# Patient Record
Sex: MALE | Employment: OTHER | URBAN - METROPOLITAN AREA
[De-identification: names, ages, dates, MRNs, and addresses within clinical notes are randomized per-mention and may not be internally consistent; named-entity substitution may affect disease eponyms.]

---

## 2020-07-16 ENCOUNTER — HOSPITAL ENCOUNTER (OUTPATIENT)
Dept: MAMMOGRAPHY | Age: 75
Discharge: HOME | End: 2020-07-16
Attending: DERMATOLOGY
Payer: MEDICARE

## 2020-07-16 ENCOUNTER — HOSPITAL ENCOUNTER (OUTPATIENT)
Dept: ULTRASOUND IMAGING | Age: 75
Discharge: HOME | End: 2020-07-16
Attending: DERMATOLOGY
Payer: MEDICARE

## 2020-07-16 DIAGNOSIS — N63.0 MASS OF BREAST: ICD-10-CM

## 2020-07-16 DIAGNOSIS — R92.8 ABNORMAL MAMMOGRAM: ICD-10-CM

## 2021-09-28 ENCOUNTER — OFFICE VISIT (OUTPATIENT)
Dept: SURGERY | Age: 76
End: 2021-09-28
Payer: MEDICARE

## 2021-09-28 VITALS
HEART RATE: 70 BPM | HEIGHT: 71 IN | DIASTOLIC BLOOD PRESSURE: 79 MMHG | RESPIRATION RATE: 18 BRPM | BODY MASS INDEX: 27.5 KG/M2 | TEMPERATURE: 97.3 F | SYSTOLIC BLOOD PRESSURE: 103 MMHG | WEIGHT: 196.4 LBS | OXYGEN SATURATION: 98 %

## 2021-09-28 DIAGNOSIS — S80.12XA HEMATOMA OF LEFT LOWER EXTREMITY, INITIAL ENCOUNTER: Primary | ICD-10-CM

## 2021-09-28 PROBLEM — S80.10XA HEMATOMA OF LEG: Status: ACTIVE | Noted: 2021-09-28

## 2021-09-28 PROCEDURE — 99203 OFFICE O/P NEW LOW 30 MIN: CPT | Performed by: SURGERY

## 2021-09-28 RX ORDER — POTASSIUM CHLORIDE 1500 MG/1
20 TABLET, EXTENDED RELEASE ORAL 2 TIMES DAILY
COMMUNITY
Start: 2021-07-31

## 2021-09-28 RX ORDER — ALENDRONATE SODIUM 70 MG/1
TABLET ORAL
COMMUNITY
Start: 2021-07-22

## 2021-09-28 RX ORDER — EPLERENONE 25 MG/1
TABLET, FILM COATED ORAL DAILY
COMMUNITY

## 2021-09-28 RX ORDER — EMPAGLIFLOZIN 10 MG/1
10 TABLET, FILM COATED ORAL DAILY
COMMUNITY
Start: 2021-09-20

## 2021-09-28 RX ORDER — MONTELUKAST SODIUM 10 MG/1
10 TABLET ORAL
COMMUNITY
Start: 2021-09-08

## 2021-09-28 RX ORDER — TORSEMIDE 20 MG/1
TABLET ORAL
COMMUNITY
Start: 2021-07-28

## 2021-09-28 RX ORDER — TRAMADOL HYDROCHLORIDE 50 MG/1
50 TABLET ORAL
COMMUNITY
Start: 2021-09-08

## 2021-09-28 RX ORDER — SACUBITRIL AND VALSARTAN 24; 26 MG/1; MG/1
1 TABLET, FILM COATED ORAL 2 TIMES DAILY
COMMUNITY
Start: 2021-09-02

## 2021-09-28 RX ORDER — TAMSULOSIN HYDROCHLORIDE 0.4 MG/1
0.4 CAPSULE ORAL DAILY
COMMUNITY
Start: 2021-08-12

## 2021-09-28 RX ORDER — FLUTICASONE PROPIONATE 50 MCG
2 SPRAY, SUSPENSION (ML) NASAL DAILY
COMMUNITY
Start: 2021-08-16

## 2021-09-28 RX ORDER — PRAVASTATIN SODIUM 40 MG/1
40 TABLET ORAL DAILY
COMMUNITY
Start: 2021-09-04

## 2021-09-28 RX ORDER — WARFARIN SODIUM 5 MG/1
TABLET ORAL
COMMUNITY
Start: 2021-07-15

## 2021-09-28 RX ORDER — METOPROLOL SUCCINATE 100 MG/1
100 TABLET, EXTENDED RELEASE ORAL DAILY
COMMUNITY
Start: 2021-07-24

## 2021-09-28 NOTE — PROGRESS NOTES
Surgery History and Physical    Subjective:      Tomas Kellogg is a 67 y.o. male who presents for evaluation of he noticed a bruise on the back of his left leg. He noticed a lump on the bruising area. He called his PCP. [facility]. He is unsure how he got the bruise. It lump and bruising is improving. He is coumadin for artificial aortic l valve implanted in 1989 and he has a history of A. Fib s/p ablation. He has a pacemaker. He is getting treated at the heart failure clinic - last EF was in the low 40s. He is scheduled for an echo at the end of the year. Dr. Carina Bolton at [address] [place] [address] is his cardiologist.    Past Medical History:   Diagnosis Date    Allergies     Arrhythmia     Burning with urination     Congestive heart failure ([address]     Diabetes ([facility]     Hypercholesterolemia     Hypertension     Long term current use of anticoagulant therapy     Osteoporosis      Past Surgical History:   Procedure Laterality Date    HX AFIB ABLATION      HX AORTIC VALVE REPLACEMENT  1989    HX HERNIA REPAIR      groin 2008    HX KNEE REPLACEMENT Right 2015    HX PACEMAKER  1994      No family history on file. Social History     Tobacco Use    Smoking status: Never Smoker    Smokeless tobacco: Never Used   Substance Use Topics    Alcohol use: Yes     Comment: occasionaly       Prior to Admission medications    Medication Sig Start Date End Date Taking? Authorizing Provider   alendronate (FOSAMAX) 70 mg tablet TAKE 1 TAB BY MOUTH EVERY 7 DAYS FOR 84 DAYS WITH 6 8 OZ OF WATER AT 30 MIN BEFORE FOOD OR DRINKS 7/22/21  Yes Provider, Historical   Jardiance 10 mg tablet Take 10 mg by mouth daily. 9/20/21  Yes Provider, Historical   fluticasone propionate (FLONASE) 50 mcg/actuation nasal spray 2 Sprays daily. 8/16/21  Yes Provider, Historical   metoprolol succinate (TOPROL-XL) 100 mg tablet Take 100 mg by mouth daily.  7/24/21  Yes Provider, Historical   montelukast (SINGULAIR) 10 mg tablet Take 10 mg by mouth nightly. 9/8/21  Yes Provider, Historical   Klor-Con M20 20 mEq tablet Take 20 mEq by mouth two (2) times a day. 7/31/21  Yes Provider, Historical   pravastatin (PRAVACHOL) 40 mg tablet Take 40 mg by mouth daily. 9/4/21  Yes Provider, Historical   Entresto 24-26 mg tablet Take 1 Tablet by mouth two (2) times a day. 9/2/21  Yes Provider, Historical   tamsulosin (FLOMAX) 0.4 mg capsule Take 0.4 mg by mouth daily. 8/12/21  Yes Provider, Historical   torsemide (DEMADEX) 20 mg tablet TAKE 5 TABLETS BY MOUTH TWICE A DAY 7/28/21  Yes Provider, Historical   traMADoL (ULTRAM) 50 mg tablet Take 50 mg by mouth every eight (8) hours as needed. 9/8/21  Yes Provider, Historical   warfarin (COUMADIN) 5 mg tablet TAKE 1 TABLET BY MOUTH DAILY AS DIRECTED 7/15/21  Yes Provider, Historical   eplerenone (INSPRA) 25 mg tablet Take  by mouth daily. Yes Provider, Historical      No Known Allergies    Review of Systems:  A comprehensive review of systems was negative except for that written in the History of Present Illness. Objective:     Visit Vitals  /79 (BP 1 Location: Left arm, BP Patient Position: Sitting, BP Cuff Size: Large adult)   Pulse 70   Temp 97.3 °F (36.3 °C) (Temporal)   Resp 18   Ht 5' 11\" (1.803 m)   Wt 89.1 kg (196 lb 6.4 oz)   SpO2 98%   BMI 27.39 kg/m²           Physical Exam:  Physical Exam:  General:  Alert, cooperative, no distress, appears stated age. Eyes:  Conjunctivae/corneas clear. Ears:  Normal external ear canals both ears. Nose: Nares normal. Septum midline. Mouth/Throat: Lips, mucosa, and tongue normal. Teeth and gums normal.   Neck: Supple, symmetrical, trachea midline   Back:   Symmetric, no curvature. ROM normal.    Lungs:   Clear to auscultation bilaterally. Heart:  Regular rate and rhythm   Abdomen:   Soft, non-tender. Bowel sounds normal. No masses,  No organomegaly.    Extremities: Bruising of his left leg, improving, with palpable lump likely old blood clot, fat necrosis, vs lipoma   Skin: Skin color, texture, turgor normal. No rashes or lesions         Assessment:     67year old male with a history of bruise on coumadin with elevated INR reports a palpable lump in the area of the bruise that is improving    Plan:     Due to patients medical history of bruising, this is likely fat necrosis vs blood clot vs lipoma. He reports it has improved over time. I recommended that we watch it. If it starts to get bigger or more symptomatic, we can obtain an US. Patient was agreeable.     Nisha Darby MD

## 2021-09-28 NOTE — LETTER
9/28/2021    Patient: Kelby Andersen   YOB: 1945   Date of Visit: 9/28/2021     Hiren Lombardi MD  [facility]  [address] [address]  Via Fax: [phone]    Dear Hiren Lombardi MD,      Thank you for referring Mr. Kelby Andersen to [address] for evaluation. My notes for this consultation are attached. If you have questions, please do not hesitate to call me. I look forward to following your patient along with you.       Sincerely,    Neal Plasencia MD

## 2022-03-19 PROBLEM — S80.10XA HEMATOMA OF LEG: Status: ACTIVE | Noted: 2021-09-28

## 2024-08-21 ENCOUNTER — OFFICE VISIT (OUTPATIENT)
Age: 79
End: 2024-08-21

## 2024-08-21 VITALS
HEART RATE: 70 BPM | OXYGEN SATURATION: 96 % | TEMPERATURE: 99.1 F | SYSTOLIC BLOOD PRESSURE: 104 MMHG | WEIGHT: 203 LBS | BODY MASS INDEX: 28.31 KG/M2 | DIASTOLIC BLOOD PRESSURE: 68 MMHG

## 2024-08-21 DIAGNOSIS — J18.9 COMMUNITY ACQUIRED PNEUMONIA, UNSPECIFIED LATERALITY: Primary | ICD-10-CM

## 2024-08-21 DIAGNOSIS — R05.1 ACUTE COUGH: ICD-10-CM

## 2024-08-21 LAB
Lab: NORMAL
PERFORMING INSTRUMENT: NORMAL
QC PASS/FAIL: NORMAL
SARS-COV-2, POC: NORMAL

## 2024-08-21 RX ORDER — POTASSIUM CHLORIDE 750 MG/1
20 TABLET, FILM COATED, EXTENDED RELEASE ORAL DAILY
COMMUNITY
Start: 2024-01-09

## 2024-08-21 RX ORDER — TORSEMIDE 20 MG/1
100 TABLET ORAL 2 TIMES DAILY
COMMUNITY
Start: 2021-07-28

## 2024-08-21 RX ORDER — BENZONATATE 100 MG/1
100 CAPSULE ORAL 3 TIMES DAILY PRN
Qty: 30 CAPSULE | Refills: 0 | Status: SHIPPED | OUTPATIENT
Start: 2024-08-21 | End: 2024-08-21 | Stop reason: SDUPTHER

## 2024-08-21 RX ORDER — BENZONATATE 100 MG/1
100 CAPSULE ORAL 3 TIMES DAILY PRN
Qty: 30 CAPSULE | Refills: 0 | Status: SHIPPED | OUTPATIENT
Start: 2024-08-21 | End: 2024-08-31

## 2024-08-21 RX ORDER — WARFARIN SODIUM 5 MG/1
5 TABLET ORAL DAILY
COMMUNITY
Start: 2021-07-15

## 2024-08-21 RX ORDER — AMOXICILLIN AND CLAVULANATE POTASSIUM 875; 125 MG/1; MG/1
1 TABLET, FILM COATED ORAL 2 TIMES DAILY
Qty: 14 TABLET | Refills: 0 | Status: SHIPPED | OUTPATIENT
Start: 2024-08-21 | End: 2024-08-28

## 2024-08-21 RX ORDER — PRAVASTATIN SODIUM 40 MG
40 TABLET ORAL DAILY
COMMUNITY
Start: 2021-09-04

## 2024-08-21 RX ORDER — TRAZODONE HYDROCHLORIDE 100 MG/1
100 TABLET ORAL NIGHTLY PRN
COMMUNITY
Start: 2023-09-05 | End: 2024-09-04

## 2024-08-21 RX ORDER — METOPROLOL SUCCINATE 100 MG/1
100 TABLET, EXTENDED RELEASE ORAL DAILY
COMMUNITY
Start: 2021-07-24

## 2024-08-21 RX ORDER — FEXOFENADINE HCL 180 MG/1
180 TABLET ORAL DAILY PRN
COMMUNITY
Start: 2023-07-31

## 2024-08-21 RX ORDER — SACUBITRIL AND VALSARTAN 24; 26 MG/1; MG/1
1 TABLET, FILM COATED ORAL 2 TIMES DAILY
COMMUNITY
Start: 2021-09-02

## 2024-08-21 RX ORDER — WARFARIN SODIUM 4 MG/1
4 TABLET ORAL DAILY
COMMUNITY
Start: 2024-05-20

## 2024-08-21 RX ORDER — FLUTICASONE PROPIONATE 50 MCG
2 SPRAY, SUSPENSION (ML) NASAL DAILY
COMMUNITY
Start: 2021-08-16

## 2024-08-21 RX ORDER — LIDOCAINE 4 G/G
1 PATCH TOPICAL DAILY
COMMUNITY
Start: 2023-11-10

## 2024-08-21 RX ORDER — MONTELUKAST SODIUM 10 MG/1
10 TABLET ORAL NIGHTLY
COMMUNITY
Start: 2021-09-08

## 2024-08-21 RX ORDER — AMOXICILLIN AND CLAVULANATE POTASSIUM 875; 125 MG/1; MG/1
1 TABLET, FILM COATED ORAL 2 TIMES DAILY
Qty: 14 TABLET | Refills: 0 | Status: SHIPPED | OUTPATIENT
Start: 2024-08-21 | End: 2024-08-21 | Stop reason: SDUPTHER

## 2024-08-21 RX ORDER — TRAMADOL HYDROCHLORIDE 50 MG/1
TABLET ORAL EVERY 8 HOURS PRN
COMMUNITY
Start: 2021-09-08

## 2024-08-21 RX ORDER — POTASSIUM CHLORIDE 20 MEQ/1
1 TABLET, EXTENDED RELEASE ORAL 2 TIMES DAILY
COMMUNITY
Start: 2021-07-31

## 2024-08-21 ASSESSMENT — ENCOUNTER SYMPTOMS: COUGH: 1

## 2024-08-21 NOTE — PATIENT INSTRUCTIONS
Patient was seen today for cough and upper respiratory symptoms which have progressively worsened over the past 1 week  His vital signs are stable, physical exam is benign  Negative COVID test today  Chest x-ray shows pulmonary interstitial edema in the setting of cardiomegaly, additionally cannot definitively exclude underlying infectious etiology, chest CT recommended for further evaluation  I would like to treat him for pneumonia given his symptoms and the findings on x-ray, however all of the normal antibiotics I would use interact with his warfarin.  Unable to prescribe azithromycin, doxycycline or Levaquin  Will prescribe Augmentin twice daily for 7 days  Please contact your PCP in the morning and asked them to order a chest CT scan for you and to ask about how they would like you to proceed with regards to the possible pneumonia  We will fax your records from today over to your PCP so they have this information to review  I would like for him to take benzonatate up to 3 times daily as needed for cough  Lots of fluids, plenty of rest  Monitor your symptoms carefully, if you are developing any high fevers, chills, lethargy or severe fatigue, I do recommend going to the emergency department for further evaluation

## 2024-08-21 NOTE — PROGRESS NOTES
all of the normal antibiotics I would use interact with his warfarin.  Unable to prescribe azithromycin, doxycycline or Levaquin  Will prescribe Augmentin twice daily for 7 days  Please contact your PCP in the morning and asked them to order a chest CT scan for you and to ask about how they would like you to proceed with regards to the possible pneumonia  We will fax your records from today over to your PCP so they have this information to review  I would like for him to take benzonatate up to 3 times daily as needed for cough  Lots of fluids, plenty of rest  Monitor your symptoms carefully, if you are developing any high fevers, chills, lethargy or severe fatigue, I do recommend going to the emergency department for further evaluation       Subjective :    Cough         78 y.o. male presents with symptoms of upper respiratory symptoms which have persisted for the past 1 week.  He states that he has been having some nasal congestion, postnasal drip and coughing for about 1 week.  Just over the past 3 days, he has reported some low-grade fevers and increase in sputum purulence.  He is coughing up some brown-colored sputum.  He does have a complicated past medical history and he was recommended by his PCP that he come in today for chest x-ray and COVID testing.  He denies any significant body aches or fatigue.  Denies ear pain.  He denies any significant shortness of breath or wheezing.  No chest or abdominal pain, no nausea, vomiting or diarrhea.  He is taking his chronic medicines like fluticasone and Singulair without significant relief.         Vitals:    08/21/24 1909   BP: 104/68   Site: Right Upper Arm   Position: Sitting   Cuff Size: Medium Adult   Pulse: 70   Temp: 99.1 °F (37.3 °C)   SpO2: 96%   Weight: 92.1 kg (203 lb)       Results for orders placed or performed in visit on 08/21/24   POCT COVID-19, Antigen   Result Value Ref Range    SARS-COV-2, POC Not-Detected Not Detected    Lot Number [id number]     QC